# Patient Record
Sex: FEMALE | Race: WHITE | NOT HISPANIC OR LATINO | URBAN - METROPOLITAN AREA
[De-identification: names, ages, dates, MRNs, and addresses within clinical notes are randomized per-mention and may not be internally consistent; named-entity substitution may affect disease eponyms.]

---

## 2021-02-04 ENCOUNTER — EMERGENCY (EMERGENCY)
Facility: HOSPITAL | Age: 21
LOS: 1 days | Discharge: HOME | End: 2021-02-04
Attending: EMERGENCY MEDICINE | Admitting: EMERGENCY MEDICINE
Payer: COMMERCIAL

## 2021-02-04 VITALS
HEIGHT: 66 IN | HEART RATE: 105 BPM | RESPIRATION RATE: 18 BRPM | WEIGHT: 164.91 LBS | SYSTOLIC BLOOD PRESSURE: 124 MMHG | TEMPERATURE: 99 F | OXYGEN SATURATION: 98 % | DIASTOLIC BLOOD PRESSURE: 89 MMHG

## 2021-02-04 DIAGNOSIS — T17.208A UNSPECIFIED FOREIGN BODY IN PHARYNX CAUSING OTHER INJURY, INITIAL ENCOUNTER: ICD-10-CM
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
## 2021-02-04 PROCEDURE — 42809 REMOVE PHARYNX FOREIGN BODY: CPT

## 2021-02-04 PROCEDURE — 99283 EMERGENCY DEPT VISIT LOW MDM: CPT | Mod: 25

## 2021-02-04 NOTE — ED PROVIDER NOTE - OBJECTIVE STATEMENT
patient with no pmhx, presents with lettuce stuck on her tonsil in the R side. notes while eating a salad couldn't swallow the piece of lettuce. fb visible on exam, at urgent care, sent to ED for removal. denies sob, stridor, drooling, cp or cough

## 2021-02-04 NOTE — ED PROVIDER NOTE - MOUTH
green lettuce lodged on R anterior tonsil, no edema or erythema appreciated, uvula midline./no ulcers

## 2021-02-04 NOTE — ED ADULT TRIAGE NOTE - CHIEF COMPLAINT QUOTE
Pt sent from  for further evaluation of foreign body x3 hours. Pt reports she was eating a salad when she felt something stuck. Able to visualize lettuce in the posterior right side of throat. Airway patent and breathing is spontaneous and unlabored.

## 2021-02-04 NOTE — ED PROVIDER NOTE - PATIENT PORTAL LINK FT
You can access the FollowMyHealth Patient Portal offered by [facility] by registering at the following website: [email or web address]. By joining [employer]’s FollowMyHealth portal, you will also be able to view your health information using other applications (apps) compatible with our system.